# Patient Record
Sex: MALE | Race: WHITE | NOT HISPANIC OR LATINO | URBAN - METROPOLITAN AREA
[De-identification: names, ages, dates, MRNs, and addresses within clinical notes are randomized per-mention and may not be internally consistent; named-entity substitution may affect disease eponyms.]

---

## 2018-09-05 ENCOUNTER — OFFICE VISIT (OUTPATIENT)
Dept: URBAN - METROPOLITAN AREA CLINIC 23 | Facility: CLINIC | Age: 68
End: 2018-09-05
Payer: MEDICARE

## 2018-09-05 DIAGNOSIS — H25.13 AGE-RELATED NUCLEAR CATARACT, BILATERAL: Primary | ICD-10-CM

## 2018-09-05 PROCEDURE — 92012 INTRM OPH EXAM EST PATIENT: CPT | Performed by: OPTOMETRIST

## 2018-09-05 ASSESSMENT — INTRAOCULAR PRESSURE
OS: 11
OD: 12

## 2018-09-05 ASSESSMENT — KERATOMETRY
OS: 44.25
OD: 42.88

## 2018-09-05 NOTE — IMPRESSION/PLAN
Impression: Age-related nuclear cataract, bilateral: H25.13 OU. Plan: Cataracts account for the patient's complaints. No treatment currently recommended. The patient will monitor vision changes and contact us with any decrease in vision.

## 2020-09-18 ENCOUNTER — OFFICE VISIT (OUTPATIENT)
Dept: URBAN - METROPOLITAN AREA CLINIC 33 | Facility: CLINIC | Age: 70
End: 2020-09-18
Payer: MEDICARE

## 2020-09-18 DIAGNOSIS — H53.19 OTHER SUBJECTIVE VISUAL DISTURBANCES: Primary | ICD-10-CM

## 2020-09-18 PROCEDURE — 92004 COMPRE OPH EXAM NEW PT 1/>: CPT | Performed by: OPHTHALMOLOGY

## 2020-09-18 ASSESSMENT — INTRAOCULAR PRESSURE
OS: 11
OD: 10

## 2020-09-18 NOTE — IMPRESSION/PLAN
Impression: Other subjective visual disturbances: H53.19. Condition: quality of life issue. Symptoms: will continue to monitor. Plan: Discussed diagnosis in detail with patient. Discussed treatment options with patient. Pt reports recent systemic work-up with Primary Care Physician. Recommend VF 24-2 ordered and performed today and results discussed with patient. CT scan ordered today. Will continue to monitor.  Return PRN

## 2020-09-18 NOTE — ASSESSMENT/PLAN
Impression:  Plan: Homonymous field loss right sided. Probably due to complicated migraine/stroke.   Continue with CT scan and follow up with PCP

## 2020-10-13 ENCOUNTER — OFFICE VISIT (OUTPATIENT)
Dept: URBAN - METROPOLITAN AREA CLINIC 23 | Facility: CLINIC | Age: 70
End: 2020-10-13
Payer: COMMERCIAL

## 2020-10-13 DIAGNOSIS — H52.4 PRESBYOPIA: Primary | ICD-10-CM

## 2020-10-13 PROCEDURE — 92015 DETERMINE REFRACTIVE STATE: CPT | Performed by: OPTOMETRIST

## 2020-10-13 PROCEDURE — 92012 INTRM OPH EXAM EST PATIENT: CPT | Performed by: OPTOMETRIST

## 2020-10-13 ASSESSMENT — KERATOMETRY: OD: 43.38

## 2020-10-13 ASSESSMENT — VISUAL ACUITY
OD: 20/25
OS: 20/25

## 2022-10-11 ENCOUNTER — OFFICE VISIT (OUTPATIENT)
Dept: URBAN - METROPOLITAN AREA CLINIC 23 | Facility: CLINIC | Age: 72
End: 2022-10-11
Payer: MEDICARE

## 2022-10-11 DIAGNOSIS — H25.13 AGE-RELATED NUCLEAR CATARACT, BILATERAL: Primary | ICD-10-CM

## 2022-10-11 DIAGNOSIS — H52.4 PRESBYOPIA: ICD-10-CM

## 2022-10-11 DIAGNOSIS — H43.812 VITREOUS DEGENERATION, LEFT EYE: ICD-10-CM

## 2022-10-11 PROCEDURE — 99213 OFFICE O/P EST LOW 20 MIN: CPT | Performed by: OPTOMETRIST

## 2022-10-11 PROCEDURE — 92015 DETERMINE REFRACTIVE STATE: CPT | Performed by: OPTOMETRIST

## 2022-10-11 ASSESSMENT — KERATOMETRY
OS: 43.75
OD: 43.50

## 2022-10-11 ASSESSMENT — INTRAOCULAR PRESSURE
OD: 15
OS: 15

## 2022-10-11 ASSESSMENT — VISUAL ACUITY
OS: 20/25
OD: 20/25

## 2022-10-11 NOTE — IMPRESSION/PLAN
Impression: Presbyopia: H52.4. Bilateral. Condition: mild. Plan: Discussed findings. New Rx given. No change to rx noted.

## 2022-10-11 NOTE — IMPRESSION/PLAN
Impression: Vitreous degeneration, left eye: H43.812. Left. Condition: mild. Plan: Discussed findings. Small PVD, no treatment necessary. Call with any vision changes.

## 2022-10-11 NOTE — IMPRESSION/PLAN
Impression: Age-related nuclear cataract, bilateral: H25.13 Bilateral. Condition: established, stable. Plan: Discussed findings. Stable cataracts, no treatment necessary. Recommend monitoring, call with any vision changes.

## 2023-10-30 ENCOUNTER — OFFICE VISIT (OUTPATIENT)
Dept: URBAN - METROPOLITAN AREA CLINIC 23 | Facility: CLINIC | Age: 73
End: 2023-10-30
Payer: MEDICARE

## 2023-10-30 DIAGNOSIS — H25.13 AGE-RELATED NUCLEAR CATARACT, BILATERAL: Primary | ICD-10-CM

## 2023-10-30 PROCEDURE — 99213 OFFICE O/P EST LOW 20 MIN: CPT | Performed by: OPTOMETRIST

## 2023-10-30 ASSESSMENT — INTRAOCULAR PRESSURE
OD: 14
OS: 14

## 2023-10-30 ASSESSMENT — KERATOMETRY
OD: 2184.5
OS: 44.25

## 2024-04-22 ENCOUNTER — OFFICE VISIT (OUTPATIENT)
Dept: URBAN - METROPOLITAN AREA CLINIC 23 | Facility: CLINIC | Age: 74
End: 2024-04-22
Payer: COMMERCIAL

## 2024-04-22 DIAGNOSIS — H52.4 PRESBYOPIA: Primary | ICD-10-CM

## 2024-04-22 PROCEDURE — 92012 INTRM OPH EXAM EST PATIENT: CPT | Performed by: OPTOMETRIST

## 2024-04-22 ASSESSMENT — KERATOMETRY
OS: 43.88
OD: 43.25

## 2024-04-22 ASSESSMENT — VISUAL ACUITY
OS: 20/25
OD: 20/25

## 2024-04-22 ASSESSMENT — INTRAOCULAR PRESSURE
OS: 14
OD: 13